# Patient Record
Sex: FEMALE | Race: WHITE
[De-identification: names, ages, dates, MRNs, and addresses within clinical notes are randomized per-mention and may not be internally consistent; named-entity substitution may affect disease eponyms.]

---

## 2018-07-13 ENCOUNTER — HOSPITAL ENCOUNTER (EMERGENCY)
Dept: HOSPITAL 80 - FED | Age: 83
Discharge: HOME | End: 2018-07-13
Payer: COMMERCIAL

## 2018-07-13 VITALS — DIASTOLIC BLOOD PRESSURE: 85 MMHG | SYSTOLIC BLOOD PRESSURE: 180 MMHG

## 2018-07-13 DIAGNOSIS — J45.909: ICD-10-CM

## 2018-07-13 DIAGNOSIS — I10: ICD-10-CM

## 2018-07-13 DIAGNOSIS — E11.9: ICD-10-CM

## 2018-07-13 DIAGNOSIS — R11.2: Primary | ICD-10-CM

## 2018-07-13 DIAGNOSIS — Z85.3: ICD-10-CM

## 2018-07-13 DIAGNOSIS — Z79.4: ICD-10-CM

## 2018-07-13 DIAGNOSIS — Z79.82: ICD-10-CM

## 2018-07-13 DIAGNOSIS — E86.9: ICD-10-CM

## 2018-07-13 LAB — PLATELET # BLD: 173 10^3/UL (ref 150–400)

## 2018-07-13 PROCEDURE — 93005 ELECTROCARDIOGRAM TRACING: CPT

## 2018-07-13 PROCEDURE — 96361 HYDRATE IV INFUSION ADD-ON: CPT

## 2018-07-13 PROCEDURE — 99284 EMERGENCY DEPT VISIT MOD MDM: CPT

## 2018-07-13 PROCEDURE — 96374 THER/PROPH/DIAG INJ IV PUSH: CPT

## 2018-07-13 NOTE — EDPHY
H & P


Time Seen by Provider: 07/13/18 20:55


HPI/ROS: 





Chief complaint.  Nausea vomiting





HPI.  84-year-old female presents with complaint of vomiting for 4 days.  No 

diarrhea.  No abdominal pain.  Unable to keep her meds down as well as fluids.  

Denies chest pain shortness of breath or fever.  No urinary symptoms.  No 

travel or exposure to Infectious Disease.  No bad food.  Slightly weak.





ROS


Constitutional.  Weakness


Eyes.  no problems with vision


ENT.  no sore throat, no nasal drainage


Cardiovascular.  no chest pain


Respiratory.  no shortness of breath, no cough


Abdominal.  No abdominal pain but vomiting.  No diarrhea


.  no problems urinating


MS.  no calf pain/swelling, no neck/back pain, no joint pain


Skin.  no rash


Lymph.  no swollen glands


Neuro.  no headache, no dizziness, no difficulty walking or with speech


Past Medical/Surgical History: 





Past medical history significant diabetes, breast cancer, asthma, hypertension


Social History: 





, nonsmoker, no alcohol


Smoking Status: Never smoked


Physical Exam: 





General Appearance:  Alert well-developed female mild distress vital signs 

significant for blood pressure 204/104.  91% pulse ox on room air


Eyes: Pupils equal and round no pallor or injection.


ENT, Mouth:  Mucous membranes are dry.


Respiratory:  There are no retractions, lungs are clear to auscultation.


Cardiovascular: Regular rate and rhythm.


Gastrointestinal:   Abdomen is soft and nontender, no masses, bowel sounds 

normal.


Neurological:  Awake and alert, sensory and motor exams grossly normal.


Skin: Warm and dry, no rashes.


Musculoskeletal:  Neck is supple nontender.


Extremities  symmetrical, full range of motion.


Psychiatric: Patient is oriented X 3, there is no agitation.


Constitutional: 


 Initial Vital Signs











Temperature (C)  37.4 C   07/13/18 20:43


 


Heart Rate  79   07/13/18 20:43


 


Respiratory Rate  18   07/13/18 20:43


 


Blood Pressure  204/104 H  07/13/18 20:43


 


O2 Sat (%)  91 L  07/13/18 20:43








 











O2 Delivery Mode               Nasal Cannula


 


O2 (L/minute)                  2














Allergies/Adverse Reactions: 


 





codeine Allergy (Mild, Verified 10/23/16 06:38)


 GI upset








Home Medications: 














 Medication  Instructions  Recorded


 


Aspirin [Aspirin 81mg (*)] 81 mg PO DAILY 01/16/14


 


Atorvastatin Calcium [Lipitor 40 40 mg PO DAILY 01/16/14





mg (*)]  


 


BENAZEPRIL HCL [Lotensin] 40 mg PO DAILY 01/16/14


 


Budesonide/Formoterol 160/4.5 2 puffs IH BID 01/16/14





[Symbicort 160-4.5 Mcg Inh (*)]  


 


Montelukast Sodium [Singulair 10 10 mg PO DAILY@1800 01/16/14





mg (*)]  


 


Omeprazole [Prilosec 40 mg] 40 mg PO DAILY 01/16/14


 


amLODIPine BESYLATE [Norvasc 5 mg 5 mg PO DAILY 01/16/14





(*)]  


 


Cholecalciferol Vit D3 [Vitamin D3 2,000 units PO DAILY 10/17/16





(*)]  


 


Docusate Sodium [Colace 100 MG (*)] 100 mg PO BID 10/17/16


 


Fluticasone Nasal [Flonase Nasal 2 sprays NASAL DAILY 10/17/16





Spray]  


 


Insulin Glargine [Lantus 100 20 units SC HS 10/17/16





UNITS/ML (*)]  


 


Polyethylene Glycol 3350 [Miralax 17 gm PO DAILY PRN 10/17/16





17 gm (*)]  


 


Bisacodyl [Bisacodyl (*)] 5 mg PO PRN PRN 10/23/16


 


Cetirizine [ZyrTEC 10 mg (*)] 10 mg PO DAILY PRN 10/23/16


 


Ondansetron Odt [Zofran Odt 4 mg 4 mg PO Q8 PRN 10/23/16





(*)]  


 


Potassium Cl [Klor-Con 10 meq (RX)] 10 meq PO DAILY 10/23/16


 


Simethicone [GAS-X] 125 mg PO BID PRN 10/23/16


 


Solifenacin Succinate [Vesicare 5 10 mg PO DAILY8 10/23/16





MG (*)]  


 


Metoprolol Succinate Xr [Toprol Xl 12.5 mg PO DAILY #30 tab.sr 10/24/16





25 mg (*)]  


 


Ondansetron Odt [Zofran Odt] 4 mg PO Q4PRN PRN #4 tab 07/13/18














Medical Decision Making





- Diagnostics


EKG Interpretation: 





EKG interpreted by me shows normal sinus rhythm with normal interval and axis.  

QRS is otherwise normal there is no significant ST elevation or depression.  No 

arrhythmia.  The rate is about 75


Procedures: 





IV normal saline.  Zofran IV


ED Course/Re-evaluation: 





Re-evaluation at 10:50 p.m..  Patient is taking oral fluids.  No further nausea 

vomiting.  No abdominal pain.  She has no complaints.  She feels well a go 

home.  The patient and I discussed laboratory evaluation, treatment plan 

including criteria for return and importance of follow-up and further 

evaluation.  She expresses understanding and agreement


Differential Diagnosis: 





I considered dehydration, electrolyte abnormality, acute coronary syndrome.





- Data Points


Laboratory Results: 


 Laboratory Results





 07/13/18 21:40 





 07/13/18 21:40 





 











  07/13/18 07/13/18





  21:40 21:40


 


WBC    2.37 10^3/uL L 10^3/uL





    (3.80-9.50) 


 


RBC    3.89 10^6/uL L 10^6/uL





    (4.18-5.33) 


 


Hgb    11.9 g/dL L g/dL





    (12.6-16.3) 


 


Hct    34.7 % L %





    (38.0-47.0) 


 


MCV    89.2 fL fL





    (81.5-99.8) 


 


MCH    30.6 pg pg





    (27.9-34.1) 


 


MCHC    34.3 g/dL g/dL





    (32.4-36.7) 


 


RDW    14.6 % %





    (11.5-15.2) 


 


Plt Count    173 10^3/uL 10^3/uL





    (150-400) 


 


MPV    9.4 fL fL





    (8.7-11.7) 


 


Neut % (Auto)    65.9 % %





    (39.3-74.2) 


 


Lymph % (Auto)    28.7 % %





    (15.0-45.0) 


 


Mono % (Auto)    3.8 % L %





    (4.5-13.0) 


 


Eos % (Auto)    0.4 % L %





    (0.6-7.6) 


 


Baso % (Auto)    0.8 % %





    (0.3-1.7) 


 


Nucleat RBC Rel Count    0.0 % %





    (0.0-0.2) 


 


Absolute Neuts (auto)    1.56 10^3/uL L 10^3/uL





    (1.70-6.50) 


 


Absolute Lymphs (auto)    0.68 10^3/uL L 10^3/uL





    (1.00-3.00) 


 


Absolute Monos (auto)    0.09 10^3/uL L 10^3/uL





    (0.30-0.80) 


 


Absolute Eos (auto)    0.01 10^3/uL L 10^3/uL





    (0.03-0.40) 


 


Absolute Basos (auto)    0.02 10^3/uL 10^3/uL





    (0.02-0.10) 


 


Absolute Nucleated RBC    0.00 10^3/uL 10^3/uL





    (0-0.01) 


 


Immature Gran %    0.4 % %





    (0.0-1.1) 


 


Immature Gran #    0.01 10^3/uL 10^3/uL





    (0.00-0.10) 


 


Sodium  132 mEq/L L mEq/L  





   (135-145)  


 


Potassium  3.7 mEq/L mEq/L  





   (3.3-5.0)  


 


Chloride  97 mEq/L mEq/L  





   ()  


 


Carbon Dioxide  29 mEq/l mEq/l  





   (22-31)  


 


Anion Gap  6 mEq/L L mEq/L  





   (8-16)  


 


BUN  19 mg/dL mg/dL  





   (7-23)  


 


Creatinine  0.7 mg/dL mg/dL  





   (0.6-1.0)  


 


Estimated GFR  > 60   





   


 


Glucose  220 mg/dL H mg/dL  





   ()  


 


Calcium  8.5 mg/dL mg/dL  





   (8.5-10.4)  











Medications Given: 


 








Discontinued Medications





Sodium Chloride (Ns)  1,000 mls @ 0 mls/hr IV EDNOW ONE; Wide Open


   PRN Reason: Protocol


   Stop: 07/13/18 21:52


   Last Admin: 07/13/18 22:00 Dose:  1,000 mls


Ondansetron HCl (Zofran)  4 mg IVP EDNOW ONE


   Stop: 07/13/18 21:52


   Last Admin: 07/13/18 22:01 Dose:  4 mg








Departure





- Departure


Disposition: Home, Routine, Self-Care


Clinical Impression: 


Vomiting


Qualifiers:


 Vomiting type: unspecified Vomiting Intractability: non-intractable Nausea 

presence: with nausea Qualified Code(s): R11.2 - Nausea with vomiting, 

unspecified





Condition: Good


Instructions:  Acute Nausea and Vomiting (ED)


Additional Instructions: 


Frequent, small sips fluids well nauseated.  Gradual diet advancement.





Zofran every 4 hr as needed for nausea and vomiting





Return for worsening symptoms.





Recheck in 1-2 days for continuing symptoms


Referrals: 


Patient,NotPresent [Unknown] - As per Instructions


Prescriptions: 


Ondansetron Odt [Zofran Odt] 4 mg PO Q4PRN PRN #4 tab


 PRN Reason: Nausea/Vomiting, Use 1st

## 2018-07-13 NOTE — CPEKG
Heart Rate: 144

RR Interval: 417

P-R Interval: 138

QRSD Interval: 102

QT Interval: 388

QTC Interval: 601

P Axis: 0

QRS Axis: 27

EKG Severity - ABNORMAL ECG -

EKG Impression: WANDERING PACEMAKER

EKG Impression: RUN OF VENTRICULAR PREMATURE COMPLEXES

EKG Impression: ABERRANT COMPLEX, POSSIBLY SUPRAVENTRICULAR

EKG Impression: LEFT VENTRICULAR HYPERTROPHY

EKG Impression: PROLONGED QT INTERVAL

Electronically Signed By: Matt Vargas 13-Jul-2018 22:16:54

## 2018-09-14 ENCOUNTER — HOSPITAL ENCOUNTER (EMERGENCY)
Dept: HOSPITAL 80 - FED | Age: 83
LOS: 1 days | Discharge: HOME | End: 2018-09-15
Payer: COMMERCIAL

## 2018-09-14 DIAGNOSIS — M81.0: ICD-10-CM

## 2018-09-14 DIAGNOSIS — E11.9: ICD-10-CM

## 2018-09-14 DIAGNOSIS — I10: Primary | ICD-10-CM

## 2018-09-14 DIAGNOSIS — J45.909: ICD-10-CM

## 2018-09-14 DIAGNOSIS — E78.5: ICD-10-CM

## 2018-09-14 DIAGNOSIS — K21.9: ICD-10-CM

## 2018-09-14 LAB — PLATELET # BLD: 187 10^3/UL (ref 150–400)

## 2018-09-14 NOTE — EDPHY
HPI/HX/ROS/PE/MDM


Narrative: 





CHIEF COMPLAINT: Vomiting, nausea





HPI: The patient is an 86 y/o female with a history of diabetes arriving via 

EMS complaining of nausea and vomiting onset today. She says, "I haven't eaten 

all day and I had a cup of juice, but threw that up." She reports she missed 

one cycle of medications today because of her nausea. She denies chest pain, 

headache, abdominal pain, diarrhea, fever. 





REVIEW OF SYSTEMS:


A comprehensive 10 system review of systems is otherwise negative aside from 

elements mentioned in the history of present illness.





PMH: Diabetes, hypertension, heart failure, constipation, GERD, hyperlipidemia, 

asthma, osteoporosis





SOCIAL HISTORY: [facility]. Full Code. 





PHYSICAL EXAM:


General:Patient is alert, in no acute distress. /101


ENT:Eyes are normal to inspection. ENT inspection normal.


Neck: Normal inspection.  Full range of motion.


Respiratory:No respiratory distress.  Breath sounds normal bilaterally.


Cardiovascular: Regular rate and rhythm.  Strong peripheral pulses.  Normal cap 

refill.


Abdomen:The abdomen is nontender to palpation. There are no peritoneal signs.


Back: Normal to inspection.  No tenderness to palpation.


Skin: Normal color.  No rash.  Warm and dry.


Extremities: Normal appearance.  Full range of motion.


Neuro: Oriented x3.  Normal motor function.  Normal sensory function. (Bayron Rosa)


ED Course: 


This is a well-appearing elderly 86 y/o female who presents complaining of 

nausea and one episode of vomiting earlier today. She missed one cycle of 

medications due to this. She is currently hypertensive around 200/100, but 

denies headache and has a normal neurovascular exam. Plan for IV, labs, blood 

pressure treatment. 10mg PO amlodipine ordered. 





2120:  Patient re-evaluated.  Completely asymptomatic and quite friendly.  BP 

trended downward briefly but now still elevated.  Hydralazine 50mg PO ordered. 





Patient care signed out to Dr. Stahl at shift change pending UA results and 

BP improvement.  (Bayron Rosa)


MDM: 





8507:  I was asked to re-evaluate the patient she is resting comfortably.  Has 

no complaints.  Blood pressure improved here down into the 170 systolic.  She 

does have underlying dementia.  However is pretty lucid this evening.  Denies 

any focal complaints she does live at a care facility and is agreeable on being 

discharged back there.  She denies any focal complaints at this time.  

Urinalysis pending.  Since her blood pressures improved, also will rule out 

UTI.  





1204:  Patient resting comfortably no complaints.  Urinalysis reviewed 

unremarkable for infection.  Blood pressure improved.  Patient like to go home.

  Return precautions discussed with her. (Jus Stahl)





- Data Points


Laboratory Results: 


 Laboratory Results





 09/14/18 19:19 





 09/14/18 19:19 





 











  09/14/18 09/14/18 09/14/18





  23:35 19:19 19:19


 


WBC      2.32 10^3/uL L 10^3/uL





     (3.80-9.50) 


 


RBC      4.07 10^6/uL L 10^6/uL





     (4.18-5.33) 


 


Hgb      12.9 g/dL g/dL





     (12.6-16.3) 


 


Hct      37.8 % L %





     (38.0-47.0) 


 


MCV      92.9 fL fL





     (81.5-99.8) 


 


MCH      31.7 pg pg





     (27.9-34.1) 


 


MCHC      34.1 g/dL g/dL





     (32.4-36.7) 


 


RDW      13.7 % %





     (11.5-15.2) 


 


Plt Count      187 10^3/uL 10^3/uL





     (150-400) 


 


MPV      9.8 fL fL





     (8.7-11.7) 


 


Neut % (Auto)      55.6 % %





     (39.3-74.2) 


 


Lymph % (Auto)      39.2 % %





     (15.0-45.0) 


 


Mono % (Auto)      3.0 % L %





     (4.5-13.0) 


 


Eos % (Auto)      1.3 % %





     (0.6-7.6) 


 


Baso % (Auto)      0.9 % %





     (0.3-1.7) 


 


Nucleat RBC Rel Count      0.0 % %





     (0.0-0.2) 


 


Absolute Neuts (auto)      1.29 10^3/uL L 10^3/uL





     (1.70-6.50) 


 


Absolute Lymphs (auto)      0.91 10^3/uL L 10^3/uL





     (1.00-3.00) 


 


Absolute Monos (auto)      0.07 10^3/uL L 10^3/uL





     (0.30-0.80) 


 


Absolute Eos (auto)      0.03 10^3/uL 10^3/uL





     (0.03-0.40) 


 


Absolute Basos (auto)      0.02 10^3/uL 10^3/uL





     (0.02-0.10) 


 


Absolute Nucleated RBC      0.00 10^3/uL 10^3/uL





     (0-0.01) 


 


Immature Gran %      0.0 % %





     (0.0-1.1) 


 


Immature Gran #      0.00 10^3/uL 10^3/uL





     (0.00-0.10) 


 


Sodium    139 mEq/L mEq/L  





    (135-145)  


 


Potassium    3.4 mEq/L mEq/L  





    (3.3-5.0)  


 


Chloride    100 mEq/L mEq/L  





    ()  


 


Carbon Dioxide    30 mEq/l mEq/l  





    (22-31)  


 


Anion Gap    9 mEq/L mEq/L  





    (8-16)  


 


BUN    16 mg/dL mg/dL  





    (7-23)  


 


Creatinine    0.7 mg/dL mg/dL  





    (0.6-1.0)  


 


Estimated GFR    > 60   





    


 


Glucose    128 mg/dL H mg/dL  





    ()  


 


Calcium    8.7 mg/dL mg/dL  





    (8.5-10.4)  


 


Urine Color  COLORLESS     





    


 


Urine Appearance  CLEAR     





    


 


Urine pH  7.0     





   (5.0-7.5)   


 


Ur Specific Gravity  1.005     





   (1.002-1.030)   


 


Urine Protein  NEGATIVE     





   (NEGATIVE)   


 


Urine Ketones  NEGATIVE     





   (NEGATIVE)   


 


Urine Blood  NEGATIVE     





   (NEGATIVE)   


 


Urine Nitrate  NEGATIVE     





   (NEGATIVE)   


 


Urine Bilirubin  NEGATIVE     





   (NEGATIVE)   


 


Urine Urobilinogen  NEGATIVE EU EU    





   (0.2-1.0)   


 


Ur Leukocyte Esterase  NEGATIVE     





   (NEGATIVE)   


 


Urine Glucose  NEGATIVE     





   (NEGATIVE)   











Medications Given: 


 








Discontinued Medications





Amlodipine Besylate (Norvasc)  10 mg PO EDNOW ONE


   Stop: 09/14/18 19:17


   Last Admin: 09/14/18 19:34 Dose:  10 mg


Hydralazine HCl (Apresoline)  50 mg PO EDNOW ONE


   Stop: 09/14/18 21:19


   Last Admin: 09/14/18 22:07 Dose:  50 mg


Sodium Chloride (Ns)  1,000 mls @ 0 mls/hr IV EDNOW ONE; Wide Open


   PRN Reason: Protocol


   Stop: 09/14/18 19:17


   Last Admin: 09/14/18 19:35 Dose:  1,000 mls


Sodium Chloride (Ns)  500 mls @ 1,000 mls/hr IV EDNOW ONE


   PRN Reason: Protocol


   Stop: 09/14/18 22:19


   Last Admin: 09/14/18 21:55 Dose:  500 mls








General


Time Seen by Provider: 09/14/18 18:48


Initial Vital Signs: 


 Initial Vital Signs











Temperature (C)  37 C   09/14/18 18:47


 


Heart Rate  76   09/14/18 18:47


 


Respiratory Rate  13   09/14/18 18:47


 


Blood Pressure  200/100 H  09/14/18 18:47


 


O2 Sat (%)  90 L  09/14/18 18:47








 











O2 Delivery Mode               Nasal Cannula


 


O2 (L/minute)                  2














Allergies/Adverse Reactions: 


 





codeine Allergy (Mild, Verified 10/23/16 06:38)


 GI upset








Home Medications: 














 Medication  Instructions  Recorded


 


Aspirin [Aspirin 81mg (*)] 81 mg PO DAILY 01/16/14


 


Atorvastatin Calcium [Lipitor 40 40 mg PO DAILY 01/16/14





mg (*)]  


 


BENAZEPRIL HCL [Lotensin] 40 mg PO DAILY 01/16/14


 


Budesonide/Formoterol 160/4.5 2 puffs IH BID 01/16/14





[Symbicort 160-4.5 Mcg Inh (*)]  


 


Montelukast Sodium [Singulair 10 10 mg PO DAILY@1800 01/16/14





mg (*)]  


 


Omeprazole [Prilosec 40 mg] 40 mg PO DAILY 01/16/14


 


amLODIPine BESYLATE [Norvasc 5 mg 5 mg PO DAILY 01/16/14





(*)]  


 


Cholecalciferol Vit D3 [Vitamin D3 2,000 units PO DAILY 10/17/16





(*)]  


 


Docusate Sodium [Colace 100 MG (*)] 100 mg PO BID 10/17/16


 


Fluticasone Nasal [Flonase Nasal 2 sprays NASAL DAILY 10/17/16





Spray]  


 


Insulin Glargine [Lantus 100 20 units SC HS 10/17/16





UNITS/ML (*)]  


 


Polyethylene Glycol 3350 [Miralax 17 gm PO DAILY PRN 10/17/16





17 gm (*)]  


 


Bisacodyl [Bisacodyl (*)] 5 mg PO PRN PRN 10/23/16


 


Cetirizine [ZyrTEC 10 mg (*)] 10 mg PO DAILY PRN 10/23/16


 


Ondansetron Odt [Zofran Odt 4 mg 4 mg PO Q8 PRN 10/23/16





(*)]  


 


Potassium Cl [Klor-Con 10 meq (RX)] 10 meq PO DAILY 10/23/16


 


Simethicone [GAS-X] 125 mg PO BID PRN 10/23/16


 


Solifenacin Succinate [Vesicare 5 10 mg PO DAILY8 10/23/16





MG (*)]  


 


Metoprolol Succinate Xr [Toprol Xl 12.5 mg PO DAILY #30 tab.sr 10/24/16





25 mg (*)]  


 


Ondansetron Odt [Zofran Odt] 4 mg PO Q4PRN PRN #4 tab 07/13/18














Departure





- Departure


Disposition: Home, Routine, Self-Care


Clinical Impression: 


Hypertension


Qualifiers:


 Hypertension type: unspecified Qualified Code(s): I10 - Essential (primary) 

hypertension





Condition: Good


Instructions:  Hypertension (ED)


Additional Instructions: 


Take all medications as directed. 





Follow up with your primary care provider for reevaluation of your blood 

pressure next week. I recommend keeping a daily log of your blood pressure with 

one morning and one evening measurement. Bring that chart with you to your next 

doctor's appointment. 


Referrals: 


Tomasz Barrett MD [Medical Doctor] - As per Instructions


Report Scribed for: Bayron Rosa


Report Scribed by: Rekha Singleton


Date of Report: 09/14/18


Time of Report: 19:17


Physician Review and Approval Statement: Portions of this note were transcribed 

by an ED scribe.  I personally performed the history, physical exam, and 

medical decision making; and confirm the accuracy of the information in the 

transcribed note.

## 2018-09-15 VITALS — DIASTOLIC BLOOD PRESSURE: 81 MMHG | SYSTOLIC BLOOD PRESSURE: 170 MMHG

## 2018-11-13 ENCOUNTER — HOSPITAL ENCOUNTER (OUTPATIENT)
Dept: HOSPITAL 80 - FIMAGING | Age: 83
End: 2018-11-13
Attending: INTERNAL MEDICINE
Payer: COMMERCIAL

## 2018-11-13 DIAGNOSIS — K22.9: Primary | ICD-10-CM

## 2018-11-13 DIAGNOSIS — K44.9: ICD-10-CM
